# Patient Record
Sex: FEMALE | URBAN - METROPOLITAN AREA
[De-identification: names, ages, dates, MRNs, and addresses within clinical notes are randomized per-mention and may not be internally consistent; named-entity substitution may affect disease eponyms.]

---

## 2020-05-02 ENCOUNTER — APPOINTMENT (OUTPATIENT)
Age: 40
Setting detail: DERMATOLOGY
End: 2020-05-04

## 2020-05-02 DIAGNOSIS — D17 BENIGN LIPOMATOUS NEOPLASM: ICD-10-CM

## 2020-05-02 PROBLEM — D17.1 BENIGN LIPOMATOUS NEOPLASM OF SKIN AND SUBCUTANEOUS TISSUE OF TRUNK: Status: ACTIVE | Noted: 2020-05-02

## 2020-05-02 PROCEDURE — 99202 OFFICE O/P NEW SF 15 MIN: CPT

## 2020-05-02 PROCEDURE — OTHER ORDER ULTRASOUND: OTHER

## 2020-05-02 PROCEDURE — OTHER REASSURANCE: OTHER

## 2020-05-02 PROCEDURE — OTHER DEFER: OTHER

## 2020-05-02 PROCEDURE — OTHER COUNSELING: OTHER

## 2020-05-02 PROCEDURE — OTHER MIPS QUALITY: OTHER

## 2020-05-02 ASSESSMENT — LOCATION ZONE DERM: LOCATION ZONE: TRUNK

## 2020-05-02 ASSESSMENT — LOCATION DETAILED DESCRIPTION DERM: LOCATION DETAILED: RIGHT RIB CAGE

## 2020-05-02 ASSESSMENT — LOCATION SIMPLE DESCRIPTION DERM: LOCATION SIMPLE: ABDOMEN

## 2020-05-02 NOTE — PROCEDURE: DEFER
Introduction Text (Please End With A Colon): Patient is aware of how we remove the lipoma, needs to have an ultrasound done first then we can further schedule excision.
Detail Level: Detailed

## 2020-05-02 NOTE — PROCEDURE: ORDER ULTRASOUND
Detail Level: Simple
Ultrasound Protocol: Ultrasound of Subcutaneous Mass
Provider: Med Lorenzo
Priority: normal
Subcutaneous Lesion Ultrasound Reason: Enlarging, bothersome
Lesion Location: Right Rib Cage